# Patient Record
Sex: MALE | Race: WHITE | NOT HISPANIC OR LATINO | Employment: OTHER | URBAN - NONMETROPOLITAN AREA
[De-identification: names, ages, dates, MRNs, and addresses within clinical notes are randomized per-mention and may not be internally consistent; named-entity substitution may affect disease eponyms.]

---

## 2017-01-01 ENCOUNTER — HOSPITAL ENCOUNTER (EMERGENCY)
Facility: HOSPITAL | Age: 42
Discharge: OTHER INSTITUTION | End: 2017-01-02
Attending: EMERGENCY MEDICINE | Admitting: EMERGENCY MEDICINE

## 2017-01-01 ENCOUNTER — APPOINTMENT (OUTPATIENT)
Dept: CT IMAGING | Facility: HOSPITAL | Age: 42
End: 2017-01-01

## 2017-01-01 ENCOUNTER — APPOINTMENT (OUTPATIENT)
Dept: GENERAL RADIOLOGY | Facility: HOSPITAL | Age: 42
End: 2017-01-01

## 2017-01-01 DIAGNOSIS — T85.618A SHUNT MALFUNCTION, INITIAL ENCOUNTER: Primary | ICD-10-CM

## 2017-01-01 LAB
ALBUMIN SERPL-MCNC: 4.4 G/DL (ref 3.5–5)
ALBUMIN/GLOB SERPL: 1.4 G/DL (ref 1.5–2.5)
ALP SERPL-CCNC: 93 U/L (ref 46–116)
ALT SERPL W P-5'-P-CCNC: 13 U/L (ref 10–44)
ANION GAP SERPL CALCULATED.3IONS-SCNC: 8.2 MMOL/L (ref 3.6–11.2)
APTT PPP: 25.2 SECONDS (ref 24.4–31)
AST SERPL-CCNC: 17 U/L (ref 10–34)
BASOPHILS # BLD AUTO: 0.01 10*3/MM3 (ref 0–0.3)
BASOPHILS NFR BLD AUTO: 0.1 % (ref 0–2)
BILIRUB SERPL-MCNC: 0.3 MG/DL (ref 0.2–1.8)
BUN BLD-MCNC: 7 MG/DL (ref 7–21)
BUN/CREAT SERPL: 14.9 (ref 7–25)
CALCIUM SPEC-SCNC: 10.3 MG/DL (ref 7.7–10)
CHLORIDE SERPL-SCNC: 101 MMOL/L (ref 99–112)
CO2 SERPL-SCNC: 26.8 MMOL/L (ref 24.3–31.9)
CREAT BLD-MCNC: 0.47 MG/DL (ref 0.43–1.29)
DEPRECATED RDW RBC AUTO: 46 FL (ref 37–54)
EOSINOPHIL # BLD AUTO: 0.01 10*3/MM3 (ref 0–0.7)
EOSINOPHIL NFR BLD AUTO: 0.1 % (ref 0–5)
ERYTHROCYTE [DISTWIDTH] IN BLOOD BY AUTOMATED COUNT: 14.1 % (ref 11.5–14.5)
GFR SERPL CREATININE-BSD FRML MDRD: >150 ML/MIN/1.73
GLOBULIN UR ELPH-MCNC: 3.2 GM/DL
GLUCOSE BLD-MCNC: 120 MG/DL (ref 70–110)
HCT VFR BLD AUTO: 45.7 % (ref 42–52)
HGB BLD-MCNC: 14.8 G/DL (ref 14–18)
IMM GRANULOCYTES # BLD: 0.01 10*3/MM3 (ref 0–0.03)
IMM GRANULOCYTES NFR BLD: 0.1 % (ref 0–0.5)
INR PPP: 0.95 (ref 0.8–1.1)
LYMPHOCYTES # BLD AUTO: 1.03 10*3/MM3 (ref 1–3)
LYMPHOCYTES NFR BLD AUTO: 15 % (ref 21–51)
MCH RBC QN AUTO: 30.5 PG (ref 27–33)
MCHC RBC AUTO-ENTMCNC: 32.4 G/DL (ref 33–37)
MCV RBC AUTO: 94.2 FL (ref 80–94)
MONOCYTES # BLD AUTO: 0.57 10*3/MM3 (ref 0.1–0.9)
MONOCYTES NFR BLD AUTO: 8.3 % (ref 0–10)
NEUTROPHILS # BLD AUTO: 5.25 10*3/MM3 (ref 1.4–6.5)
NEUTROPHILS NFR BLD AUTO: 76.4 % (ref 30–70)
OSMOLALITY SERPL CALC.SUM OF ELEC: 271.1 MOSM/KG (ref 273–305)
PHENOBARB SERPL-MCNC: 19.7 MCG/ML (ref 15–40)
PLATELET # BLD AUTO: 205 10*3/MM3 (ref 130–400)
PMV BLD AUTO: 11.6 FL (ref 6–10)
POTASSIUM BLD-SCNC: 4.1 MMOL/L (ref 3.5–5.3)
PROT SERPL-MCNC: 7.6 G/DL (ref 6–8)
PROTHROMBIN TIME: 10.7 SECONDS (ref 9.8–11.9)
RBC # BLD AUTO: 4.85 10*6/MM3 (ref 4.7–6.1)
SODIUM BLD-SCNC: 136 MMOL/L (ref 135–153)
WBC NRBC COR # BLD: 6.88 10*3/MM3 (ref 4.5–12.5)

## 2017-01-01 PROCEDURE — 99285 EMERGENCY DEPT VISIT HI MDM: CPT

## 2017-01-01 PROCEDURE — 74000 XR SHUNT SERIES: CPT | Performed by: RADIOLOGY

## 2017-01-01 PROCEDURE — 70450 CT HEAD/BRAIN W/O DYE: CPT

## 2017-01-01 PROCEDURE — 85025 COMPLETE CBC W/AUTO DIFF WBC: CPT | Performed by: EMERGENCY MEDICINE

## 2017-01-01 PROCEDURE — 70250 X-RAY EXAM OF SKULL: CPT | Performed by: RADIOLOGY

## 2017-01-01 PROCEDURE — 85730 THROMBOPLASTIN TIME PARTIAL: CPT | Performed by: EMERGENCY MEDICINE

## 2017-01-01 PROCEDURE — 70450 CT HEAD/BRAIN W/O DYE: CPT | Performed by: RADIOLOGY

## 2017-01-01 PROCEDURE — 80053 COMPREHEN METABOLIC PANEL: CPT | Performed by: EMERGENCY MEDICINE

## 2017-01-01 PROCEDURE — 71020 HC CHEST PA AND LATERAL: CPT

## 2017-01-01 PROCEDURE — 70250 X-RAY EXAM OF SKULL: CPT

## 2017-01-01 PROCEDURE — 80184 ASSAY OF PHENOBARBITAL: CPT | Performed by: EMERGENCY MEDICINE

## 2017-01-01 PROCEDURE — 85610 PROTHROMBIN TIME: CPT | Performed by: EMERGENCY MEDICINE

## 2017-01-01 PROCEDURE — 71020 XR SHUNT SERIES: CPT | Performed by: RADIOLOGY

## 2017-01-01 PROCEDURE — 74000 HC ABDOMEN KUB: CPT

## 2017-01-01 RX ORDER — SODIUM CHLORIDE 0.9 % (FLUSH) 0.9 %
10 SYRINGE (ML) INJECTION AS NEEDED
Status: DISCONTINUED | OUTPATIENT
Start: 2017-01-01 | End: 2017-01-02 | Stop reason: HOSPADM

## 2017-01-01 RX ORDER — PHENYTOIN 125 MG/5ML
SUSPENSION ORAL ONCE
COMMUNITY

## 2017-01-01 RX ORDER — PHENOBARBITAL 100 MG/1
100 TABLET ORAL DAILY
COMMUNITY

## 2017-01-02 VITALS
HEART RATE: 69 BPM | HEIGHT: 67 IN | DIASTOLIC BLOOD PRESSURE: 64 MMHG | TEMPERATURE: 98.4 F | BODY MASS INDEX: 21.19 KG/M2 | WEIGHT: 135 LBS | OXYGEN SATURATION: 100 % | SYSTOLIC BLOOD PRESSURE: 112 MMHG | RESPIRATION RATE: 20 BRPM

## 2017-01-02 NOTE — ED PROVIDER NOTES
Subjective   HPI Comments: Pt brought in for concern for worsening headache.  Has had headache for 2 weeks.  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  No fever.  No abdominal pain.  No injury.  No diarrhea.  No urinary sxs.    Patient is a 41 y.o. male presenting with headaches.   History provided by:  Relative  Headache   Pain location:  Generalized  Quality:  Unable to specify  Radiates to:  Does not radiate  Severity currently:  10/10  Severity at highest:  10/10  Onset quality:  Gradual  Duration:  2 weeks  Timing:  Constant  Progression:  Worsening  Chronicity:  Recurrent  Similar to prior headaches: yes    Context: not activity, not exposure to bright light, not caffeine, not coughing, not defecating, not eating, not stress, not exposure to cold air, not intercourse, not loud noise and not straining    Relieved by:  Nothing  Worsened by:  Nothing  Ineffective treatments:  None tried  Associated symptoms: nausea and vomiting    Associated symptoms: no abdominal pain, no back pain, no blurred vision, no congestion, no cough, no diarrhea, no dizziness, no drainage, no ear pain, no eye pain, no facial pain, no fatigue, no fever, no focal weakness, no hearing loss, no loss of balance, no myalgias, no near-syncope, no neck pain, no neck stiffness, no numbness, no paresthesias, no photophobia, no seizures, no sinus pressure, no sore throat, no swollen glands, no syncope, no tingling, no URI, no visual change and no weakness    Risk factors: no anger, no family hx of SAH, does not have insomnia and lifestyle not sedentary        Review of Systems   Constitutional: Negative.  Negative for fatigue and fever.   HENT: Negative for congestion, drooling, ear pain, hearing loss, postnasal drip, rhinorrhea, sinus pressure, sore throat, trouble swallowing and voice change.    Eyes: Negative.  Negative for blurred vision, photophobia and pain.   Respiratory: Negative.  Negative for cough.     Cardiovascular: Negative.  Negative for syncope and near-syncope.   Gastrointestinal: Positive for nausea and vomiting. Negative for abdominal distention, abdominal pain and diarrhea.        Retching, dry heaves   Endocrine: Negative.    Genitourinary: Negative.    Musculoskeletal: Negative.  Negative for back pain, myalgias, neck pain and neck stiffness.   Skin: Negative.    Allergic/Immunologic: Negative.    Neurological: Positive for headaches. Negative for dizziness, focal weakness, seizures, weakness, numbness, paresthesias and loss of balance.   Hematological: Negative.    Psychiatric/Behavioral: Negative.    All other systems reviewed and are negative.      Past Medical History   Diagnosis Date   • Paralysis    • Seizures        No Known Allergies    Past Surgical History   Procedure Laterality Date   •  shunt insertion         History reviewed. No pertinent family history.    Social History     Social History   • Marital status: Single     Spouse name: N/A   • Number of children: N/A   • Years of education: N/A     Social History Main Topics   • Smoking status: Never Smoker   • Smokeless tobacco: None   • Alcohol use No   • Drug use: No   • Sexual activity: No     Other Topics Concern   • None     Social History Narrative   • None           Objective   Physical Exam   Constitutional: He is oriented to person, place, and time. He appears well-developed and well-nourished. No distress.   HENT:   Head: Normocephalic and atraumatic.   Nose: Nose normal.   Mouth/Throat: Oropharynx is clear and moist.   Eyes: Conjunctivae and EOM are normal. Pupils are equal, round, and reactive to light. Right eye exhibits no discharge. Left eye exhibits no discharge. No scleral icterus.   Neck: Neck supple. No tracheal deviation present.   Cardiovascular: Normal rate, regular rhythm and normal heart sounds.  Exam reveals no gallop and no friction rub.    No murmur heard.  Pulmonary/Chest: Effort normal. No stridor. No  respiratory distress. He has no wheezes. He has no rales.   Abdominal: Soft. He exhibits no distension and no mass. There is no tenderness. There is no guarding.   Musculoskeletal: Normal range of motion. He exhibits no deformity.   Lymphadenopathy:     He has no cervical adenopathy.   Neurological: He is alert and oriented to person, place, and time. He exhibits normal muscle tone. Coordination normal.   Skin: Skin is warm and dry. No pallor.   Psychiatric: He has a normal mood and affect.   Baseline per family   Nursing note and vitals reviewed.      Procedures         ED Course  ED Course   Comment By Time   D/W Dr Stone at [facility]-aceepting to ER Pranav Leone MD 01/01 2202      XR Shunt Series   ED Interpretation   Shunt series   My read   There are 2 shunt tubes visibly present on series.  There is   disruption of 1.  This is presumably his old shunt.  There is   contiguous continuity of the other shunt tubing.      Final Result   As above.       COMMUNICATION: Per this written report.           This report was finalized on 1/2/2017 7:35 AM by Dr. Obed Santos MD.          CT Head Without Contrast   ED Interpretation   CT head without IV contrast.   Faxed report from [email or web address].   Findings:   Brain: Posterior fossa cystic structure which appears to be   communicating with the fourth ventricle likely related to   Dandy-Walker malformation.  Agenesis/dysgenesis of the corpus   callosum.  No hemorrhage.  No significant white matter disease.    No edema.   Ventricles: Dysmorphic appearance of the occipital horns of the   lateral ventricles.  In occipital approach shunt catheter also   noted significant motion artifact limits assessment.  Slight   interval increase in size of the third ventricle noted since   previous exam (September 30, 2015) overall size of the fourth   ventricle is similar.   Impression: No acute intracranial hemorrhage.  Slight interval   increase in the size of the third  ventricle since previous exam.      Final Result   Continued bilateral ventriculomegaly and associated   cystic change of the posterior fossa.       This report was finalized on 1/2/2017 7:33 AM by Dr. Obed Santos MD.            Labs Reviewed   COMPREHENSIVE METABOLIC PANEL - Abnormal; Notable for the following:        Result Value    Glucose 120 (*)     Calcium 10.3 (*)     A/G Ratio 1.4 (*)     All other components within normal limits   CBC WITH AUTO DIFFERENTIAL - Abnormal; Notable for the following:     MCV 94.2 (*)     MCHC 32.4 (*)     MPV 11.6 (*)     Neutrophil % 76.4 (*)     Lymphocyte % 15.0 (*)     All other components within normal limits   OSMOLALITY, CALCULATED - Abnormal; Notable for the following:     Osmolality Calc 271.1 (*)     All other components within normal limits   PROTIME-INR - Normal    Narrative:     Patients not on anticoagulant therapy:    INR 0.90-1.10     Suggested INR therapeutic range for stable oral anticoagulant therapy:             Routine therapy                      2.00-3.00           Recurrent MI                         2.50-3.50           Mechanical prosthetic valve          2.50-3.50   APTT - Normal    Narrative:     Heparin protocol:    Therapeutic range 56-80 seconds   PHENOBARBITAL LEVEL - Normal   CBC AND DIFFERENTIAL    Narrative:     The following orders were created for panel order CBC & Differential.  Procedure                               Abnormality         Status                     ---------                               -----------         ------                     CBC Auto Differential[[id number]]         Abnormal            Final result                 Please view results for these tests on the individual orders.        Medication List      ASK your doctor about these medications          PHENobarbital 100 MG tablet   Commonly known as:  LUMINAL       phenytoin 125 MG/5ML suspension   Commonly known as:  DILANTIN                     [facility]  Number of  Diagnoses or Management Options  Shunt malfunction, initial encounter: new and requires workup     Amount and/or Complexity of Data Reviewed  Clinical lab tests: ordered and reviewed  Tests in the radiology section of CPT®: ordered and reviewed  Obtain history from someone other than the patient: yes  Discuss the patient with other providers: yes  Independent visualization of images, tracings, or specimens: yes    Risk of Complications, Morbidity, and/or Mortality  Presenting problems: high  Diagnostic procedures: high  Management options: high    Patient Progress  Patient progress: stable      Final diagnoses:   Shunt malfunction, initial encounter            Pranav Leone MD  01/07/17 0631

## 2017-01-02 NOTE — ED NOTES
Pt resting in bed, Pt alert, multiple visitors at bedside. They report pt has had a severe lingering headache in the past and his  shunt was clogged up. Pt and family made aware of reason for wait (delay due to multiple critical patients). Denies any current needs, waiting to be seen by provider.      Vinod Mo RN  01/01/17 1929

## 2017-01-02 NOTE — ED NOTES
I called [facility] for a ride, they are going to check with their supervisor.     Deshaun Thurston  01/01/17 1064

## 2017-01-02 NOTE — ED NOTES
I called  for their neurosurgeon. While on the phone, I faxed a face sheet to them.     Deshaun Thurston  01/01/17 2208

## 2017-01-02 NOTE — ED NOTES
I called [facility] to see if they could take the patient any sooner. They are not able to take any runs at all right now.     Deshaun Thurston  01/01/17 5016

## 2017-01-02 NOTE — ED NOTES
I called [facility], awaiting for the [facility] to call us back.     Deshaun Thurston  01/01/17 8283

## 2017-01-19 ENCOUNTER — APPOINTMENT (OUTPATIENT)
Dept: CT IMAGING | Facility: HOSPITAL | Age: 42
End: 2017-01-19

## 2017-01-19 ENCOUNTER — HOSPITAL ENCOUNTER (EMERGENCY)
Facility: HOSPITAL | Age: 42
Discharge: ANOTHER HOSPITAL | End: 2017-01-19
Attending: EMERGENCY MEDICINE | Admitting: EMERGENCY MEDICINE

## 2017-01-19 VITALS
TEMPERATURE: 97.6 F | SYSTOLIC BLOOD PRESSURE: 166 MMHG | WEIGHT: 134.92 LBS | BODY MASS INDEX: 20.45 KG/M2 | DIASTOLIC BLOOD PRESSURE: 112 MMHG | RESPIRATION RATE: 18 BRPM | HEART RATE: 104 BPM | HEIGHT: 68 IN | OXYGEN SATURATION: 97 %

## 2017-01-19 DIAGNOSIS — R56.9 CONVULSIONS, UNSPECIFIED CONVULSION TYPE (HCC): ICD-10-CM

## 2017-01-19 DIAGNOSIS — Q03.1 DANDY-WALKER SYNDROME (HCC): ICD-10-CM

## 2017-01-19 DIAGNOSIS — H54.3 BILATERAL BLINDNESS: Primary | ICD-10-CM

## 2017-01-19 LAB
ALBUMIN SERPL-MCNC: 4.4 G/DL (ref 3.5–5)
ALBUMIN/GLOB SERPL: 1.4 G/DL (ref 1.5–2.5)
ALP SERPL-CCNC: 104 U/L (ref 46–116)
ALT SERPL W P-5'-P-CCNC: 24 U/L (ref 10–44)
ANION GAP SERPL CALCULATED.3IONS-SCNC: 8.7 MMOL/L (ref 3.6–11.2)
AST SERPL-CCNC: 21 U/L (ref 10–34)
BACTERIA UR QL AUTO: ABNORMAL /HPF
BASOPHILS # BLD AUTO: 0.01 10*3/MM3 (ref 0–0.3)
BASOPHILS NFR BLD AUTO: 0.1 % (ref 0–2)
BILIRUB SERPL-MCNC: 0.4 MG/DL (ref 0.2–1.8)
BILIRUB UR QL STRIP: NEGATIVE
BUN BLD-MCNC: 8 MG/DL (ref 7–21)
BUN/CREAT SERPL: 21.6 (ref 7–25)
CALCIUM SPEC-SCNC: 10.3 MG/DL (ref 7.7–10)
CHLORIDE SERPL-SCNC: 100 MMOL/L (ref 99–112)
CLARITY UR: ABNORMAL
CO2 SERPL-SCNC: 32.3 MMOL/L (ref 24.3–31.9)
COLOR UR: YELLOW
CREAT BLD-MCNC: 0.37 MG/DL (ref 0.43–1.29)
CRP SERPL-MCNC: <0.5 MG/DL (ref 0–0.99)
D-LACTATE SERPL-SCNC: 0.8 MMOL/L (ref 0.5–2)
DEPRECATED RDW RBC AUTO: 46.2 FL (ref 37–54)
EOSINOPHIL # BLD AUTO: 0.03 10*3/MM3 (ref 0–0.7)
EOSINOPHIL NFR BLD AUTO: 0.4 % (ref 0–5)
ERYTHROCYTE [DISTWIDTH] IN BLOOD BY AUTOMATED COUNT: 13.9 % (ref 11.5–14.5)
ERYTHROCYTE [SEDIMENTATION RATE] IN BLOOD: 9 MM/HR (ref 0–15)
GFR SERPL CREATININE-BSD FRML MDRD: >150 ML/MIN/1.73
GLOBULIN UR ELPH-MCNC: 3.2 GM/DL
GLUCOSE BLD-MCNC: 119 MG/DL (ref 70–110)
GLUCOSE UR STRIP-MCNC: NEGATIVE MG/DL
HCT VFR BLD AUTO: 49.9 % (ref 42–52)
HGB BLD-MCNC: 16.5 G/DL (ref 14–18)
HGB UR QL STRIP.AUTO: ABNORMAL
HYALINE CASTS UR QL AUTO: ABNORMAL /LPF
IMM GRANULOCYTES # BLD: 0.02 10*3/MM3 (ref 0–0.03)
IMM GRANULOCYTES NFR BLD: 0.3 % (ref 0–0.5)
KETONES UR QL STRIP: NEGATIVE
LEUKOCYTE ESTERASE UR QL STRIP.AUTO: ABNORMAL
LYMPHOCYTES # BLD AUTO: 0.88 10*3/MM3 (ref 1–3)
LYMPHOCYTES NFR BLD AUTO: 11.8 % (ref 21–51)
MCH RBC QN AUTO: 31.2 PG (ref 27–33)
MCHC RBC AUTO-ENTMCNC: 33.1 G/DL (ref 33–37)
MCV RBC AUTO: 94.3 FL (ref 80–94)
MONOCYTES # BLD AUTO: 0.55 10*3/MM3 (ref 0.1–0.9)
MONOCYTES NFR BLD AUTO: 7.4 % (ref 0–10)
NEUTROPHILS # BLD AUTO: 5.97 10*3/MM3 (ref 1.4–6.5)
NEUTROPHILS NFR BLD AUTO: 80 % (ref 30–70)
NITRITE UR QL STRIP: NEGATIVE
OSMOLALITY SERPL CALC.SUM OF ELEC: 280.7 MOSM/KG (ref 273–305)
PH UR STRIP.AUTO: 6 [PH] (ref 5–8)
PLATELET # BLD AUTO: 253 10*3/MM3 (ref 130–400)
PMV BLD AUTO: 10.8 FL (ref 6–10)
POTASSIUM BLD-SCNC: 4 MMOL/L (ref 3.5–5.3)
PROT SERPL-MCNC: 7.6 G/DL (ref 6–8)
PROT UR QL STRIP: NEGATIVE
RBC # BLD AUTO: 5.29 10*6/MM3 (ref 4.7–6.1)
RBC # UR: ABNORMAL /HPF
REF LAB TEST METHOD: ABNORMAL
SODIUM BLD-SCNC: 141 MMOL/L (ref 135–153)
SP GR UR STRIP: 1.02 (ref 1–1.03)
SQUAMOUS #/AREA URNS HPF: ABNORMAL /HPF
UROBILINOGEN UR QL STRIP: ABNORMAL
WBC NRBC COR # BLD: 7.46 10*3/MM3 (ref 4.5–12.5)
WBC UR QL AUTO: ABNORMAL /HPF

## 2017-01-19 PROCEDURE — 87086 URINE CULTURE/COLONY COUNT: CPT | Performed by: EMERGENCY MEDICINE

## 2017-01-19 PROCEDURE — 81001 URINALYSIS AUTO W/SCOPE: CPT | Performed by: EMERGENCY MEDICINE

## 2017-01-19 PROCEDURE — 70450 CT HEAD/BRAIN W/O DYE: CPT | Performed by: RADIOLOGY

## 2017-01-19 PROCEDURE — 80053 COMPREHEN METABOLIC PANEL: CPT | Performed by: EMERGENCY MEDICINE

## 2017-01-19 PROCEDURE — 87077 CULTURE AEROBIC IDENTIFY: CPT | Performed by: EMERGENCY MEDICINE

## 2017-01-19 PROCEDURE — 83605 ASSAY OF LACTIC ACID: CPT | Performed by: EMERGENCY MEDICINE

## 2017-01-19 PROCEDURE — 85652 RBC SED RATE AUTOMATED: CPT | Performed by: EMERGENCY MEDICINE

## 2017-01-19 PROCEDURE — 86140 C-REACTIVE PROTEIN: CPT | Performed by: EMERGENCY MEDICINE

## 2017-01-19 PROCEDURE — 99284 EMERGENCY DEPT VISIT MOD MDM: CPT

## 2017-01-19 PROCEDURE — 70450 CT HEAD/BRAIN W/O DYE: CPT

## 2017-01-19 PROCEDURE — 87040 BLOOD CULTURE FOR BACTERIA: CPT | Performed by: EMERGENCY MEDICINE

## 2017-01-19 PROCEDURE — 87186 SC STD MICRODIL/AGAR DIL: CPT | Performed by: EMERGENCY MEDICINE

## 2017-01-19 PROCEDURE — 36415 COLL VENOUS BLD VENIPUNCTURE: CPT

## 2017-01-19 PROCEDURE — 85025 COMPLETE CBC W/AUTO DIFF WBC: CPT | Performed by: EMERGENCY MEDICINE

## 2017-01-19 PROCEDURE — 87088 URINE BACTERIA CULTURE: CPT | Performed by: EMERGENCY MEDICINE

## 2017-01-19 RX ORDER — CLONIDINE HYDROCHLORIDE 0.1 MG/1
0.1 TABLET ORAL ONCE
Status: COMPLETED | OUTPATIENT
Start: 2017-01-19 | End: 2017-01-19

## 2017-01-19 RX ORDER — CLONIDINE HYDROCHLORIDE 0.1 MG/1
TABLET ORAL
Status: COMPLETED
Start: 2017-01-19 | End: 2017-01-19

## 2017-01-19 RX ORDER — CARBAMAZEPINE 100 MG/5ML
SUSPENSION ORAL 4 TIMES DAILY
COMMUNITY

## 2017-01-19 RX ORDER — SODIUM CHLORIDE 0.9 % (FLUSH) 0.9 %
10 SYRINGE (ML) INJECTION AS NEEDED
Status: DISCONTINUED | OUTPATIENT
Start: 2017-01-19 | End: 2017-01-20 | Stop reason: HOSPADM

## 2017-01-19 RX ORDER — HYDROCODONE BITARTRATE AND ACETAMINOPHEN 7.5; 325 MG/1; MG/1
1 TABLET ORAL EVERY 6 HOURS PRN
COMMUNITY

## 2017-01-19 RX ORDER — HYDROCODONE BITARTRATE AND ACETAMINOPHEN 10; 325 MG/1; MG/1
1 TABLET ORAL ONCE
Status: COMPLETED | OUTPATIENT
Start: 2017-01-19 | End: 2017-01-19

## 2017-01-19 RX ADMIN — CLONIDINE HYDROCHLORIDE 0.1 MG: 0.1 TABLET ORAL at 23:08

## 2017-01-19 RX ADMIN — HYDROCODONE BITARTRATE AND ACETAMINOPHEN 1 TABLET: 10; 325 TABLET ORAL at 22:28

## 2017-01-19 RX ADMIN — CLONIDINE HYDROCHLORIDE 0.1 MG: 0.1 TABLET ORAL at 22:29

## 2017-01-20 NOTE — ED PROVIDER NOTES
Subjective   Patient is a 42 y.o. male presenting with seizures.   Seizures   Seizure activity on arrival: no    Seizure type:  Unable to specify  Preceding symptoms: headache    Initial focality:  Unable to specify  Episode characteristics: confusion and disorientation    Postictal symptoms: confusion, memory loss and somnolence    Return to baseline: yes    Severity:  Mild  Timing:  Intermittent  Number of seizures this episode:  Unknown  Progression:  Worsening  Context: developmental delay, hydrocephalus, intracranial lesion and intracranial shunt    Context: not alcohol withdrawal, not change in medication, not sleeping less, not drug use, not emotional upset and not fever    Recent head injury:  No recent head injuries  PTA treatment:  None  History of seizures: yes        Review of Systems   Constitutional: Negative for activity change, appetite change, chills, diaphoresis, fatigue and fever.   HENT: Negative for congestion, ear pain and sore throat.    Eyes: Positive for visual disturbance. Negative for redness.   Respiratory: Negative for cough, chest tightness, shortness of breath and wheezing.    Cardiovascular: Negative for chest pain, palpitations and leg swelling.   Gastrointestinal: Negative for abdominal pain, diarrhea, nausea and vomiting.   Genitourinary: Negative for dysuria and urgency.   Musculoskeletal: Negative for arthralgias, back pain, myalgias and neck pain.   Skin: Negative for pallor, rash and wound.   Neurological: Positive for seizures. Negative for dizziness, speech difficulty, weakness and headaches.   Psychiatric/Behavioral: Negative for agitation, behavioral problems, confusion and decreased concentration.       Past Medical History   Diagnosis Date   • Dandy-Walker syndrome    • Paralysis    • Seizures        No Known Allergies    Past Surgical History   Procedure Laterality Date   •  shunt insertion     • Csf shunt         History reviewed. No pertinent family  history.    Social History     Social History   • Marital status: Single     Spouse name: N/A   • Number of children: N/A   • Years of education: N/A     Social History Main Topics   • Smoking status: Never Smoker   • Smokeless tobacco: None   • Alcohol use No   • Drug use: No   • Sexual activity: No     Other Topics Concern   • None     Social History Narrative   • None           Objective   Physical Exam   Constitutional: He is oriented to person, place, and time. He appears well-developed and well-nourished.  Non-toxic appearance. No distress.   HENT:   Head: Normocephalic and atraumatic.   Right Ear: External ear normal.   Left Ear: External ear normal.   Nose: Nose normal.   Mouth/Throat: Oropharynx is clear and moist and mucous membranes are normal. No oropharyngeal exudate. No tonsillar exudate.   Eyes: Conjunctivae and lids are normal.   Sluggish Bilateral Pupillary Response.    Neck: Normal range of motion and full passive range of motion without pain. Neck supple. No thyromegaly present.   Cardiovascular: Intact distal pulses and normal pulses.  Tachycardia present.    Pulmonary/Chest: Effort normal and breath sounds normal. No tachypnea. No respiratory distress. He has no decreased breath sounds. He has no wheezes. He has no rales. He exhibits no tenderness.   Abdominal: Soft. Normal appearance and bowel sounds are normal. He exhibits no distension. There is no tenderness. There is no rebound and no guarding.   Musculoskeletal: He exhibits no edema, tenderness or deformity.   Lymphadenopathy:     He has no cervical adenopathy.   Neurological: He is alert and oriented to person, place, and time. He has normal strength. No cranial nerve deficit or sensory deficit. GCS eye subscore is 4. GCS verbal subscore is 5. GCS motor subscore is 6.   Skin: Skin is warm, dry and intact. No rash noted. He is not diaphoretic. No erythema. No pallor.   Psychiatric: He has a normal mood and affect. His speech is normal and  behavior is normal. Cognition and memory are normal.   Nursing note and vitals reviewed.      Procedures         ED Course  ED Course   Comment By Time   Patient transferred to  for further evaluation and possible treatment.  Has extensive and complex PMH including Dandy-Walker Syndrome with Seizures.  Has multiple Shunts.    Became suddenly Blind Bilaterally and having increased Seizures about a week ago.  Dicussed case with , and will transfer at this time for possible treatment.  Will need both Neurology and Neurosurgery. 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  Convulsions, unspecified convulsion type: established and worsening  Dandy-Walker syndrome: established and worsening     Amount and/or Complexity of Data Reviewed  Clinical lab tests: ordered and reviewed  Tests in the radiology section of CPT®: ordered and reviewed  Tests in the medicine section of CPT®: ordered and reviewed  Discussion of test results with the performing providers: yes  Review and summarize past medical records: yes  Discuss the patient with other providers: yes  Independent visualization of images, tracings, or specimens: yes    Risk of Complications, Morbidity, and/or Mortality  Presenting problems: moderate  Diagnostic procedures: moderate  Management options: moderate    Patient Progress  Patient progress: stable      Final diagnoses:   Bilateral blindness   Convulsions, unspecified convulsion type   Dandy-Walker syndrome            Cy Goncalves MD  01/20/17 4015

## 2017-01-20 NOTE — ED NOTES
[facility] here to transport pt to  ER. IV site patent and brief dry. No acute distress. Needs attended. Another clonidine given for elevated blood pressure.     Padma Barnett RN  01/19/17 6414

## 2017-01-23 LAB
BACTERIA SPEC AEROBE CULT: ABNORMAL
BACTERIA SPEC AEROBE CULT: ABNORMAL

## 2017-01-24 LAB
BACTERIA SPEC AEROBE CULT: NORMAL
BACTERIA SPEC AEROBE CULT: NORMAL

## 2019-06-08 NOTE — ED NOTES
Pt's brief is clean and dry. Pt resting in bed, waiting for transportation. Pt aunt at bedside. Denies any current needs.      Vinod Mo RN  01/01/17 6533       Vinod Mo RN  01/01/17 1381     no

## 2023-01-25 NOTE — ED NOTES
Pt visitors request if patient can have something to drink. Made aware patient is NPO until CT/XR completed, verb understanding.      Vinod Mo RN  01/01/17 2009     Complex Repair Preamble Text (Leave Blank If You Do Not Want): Extensive wide undermining was performed.